# Patient Record
Sex: FEMALE | Race: WHITE
[De-identification: names, ages, dates, MRNs, and addresses within clinical notes are randomized per-mention and may not be internally consistent; named-entity substitution may affect disease eponyms.]

---

## 2018-02-08 ENCOUNTER — HOSPITAL ENCOUNTER (OUTPATIENT)
Dept: HOSPITAL 17 - NEPE | Age: 83
Setting detail: OBSERVATION
LOS: 1 days | Discharge: HOME | End: 2018-02-09
Attending: INTERNAL MEDICINE | Admitting: INTERNAL MEDICINE
Payer: MEDICAID

## 2018-02-08 VITALS
TEMPERATURE: 98.8 F | RESPIRATION RATE: 18 BRPM | HEART RATE: 58 BPM | OXYGEN SATURATION: 97 % | SYSTOLIC BLOOD PRESSURE: 222 MMHG | DIASTOLIC BLOOD PRESSURE: 90 MMHG

## 2018-02-08 VITALS
DIASTOLIC BLOOD PRESSURE: 112 MMHG | HEART RATE: 69 BPM | TEMPERATURE: 97.8 F | OXYGEN SATURATION: 100 % | SYSTOLIC BLOOD PRESSURE: 246 MMHG | RESPIRATION RATE: 18 BRPM

## 2018-02-08 VITALS
OXYGEN SATURATION: 98 % | HEART RATE: 59 BPM | SYSTOLIC BLOOD PRESSURE: 169 MMHG | DIASTOLIC BLOOD PRESSURE: 64 MMHG | RESPIRATION RATE: 18 BRPM

## 2018-02-08 VITALS — BODY MASS INDEX: 18.18 KG/M2 | WEIGHT: 92.59 LBS | HEIGHT: 60 IN

## 2018-02-08 VITALS
TEMPERATURE: 97.8 F | HEART RATE: 74 BPM | OXYGEN SATURATION: 97 % | DIASTOLIC BLOOD PRESSURE: 60 MMHG | SYSTOLIC BLOOD PRESSURE: 130 MMHG | RESPIRATION RATE: 18 BRPM

## 2018-02-08 VITALS — OXYGEN SATURATION: 99 %

## 2018-02-08 DIAGNOSIS — I20.9: ICD-10-CM

## 2018-02-08 DIAGNOSIS — R00.1: ICD-10-CM

## 2018-02-08 DIAGNOSIS — I44.0: ICD-10-CM

## 2018-02-08 DIAGNOSIS — R07.89: Primary | ICD-10-CM

## 2018-02-08 DIAGNOSIS — I10: ICD-10-CM

## 2018-02-08 DIAGNOSIS — R94.31: ICD-10-CM

## 2018-02-08 DIAGNOSIS — J32.4: ICD-10-CM

## 2018-02-08 DIAGNOSIS — R06.00: ICD-10-CM

## 2018-02-08 DIAGNOSIS — Z79.899: ICD-10-CM

## 2018-02-08 DIAGNOSIS — Z79.82: ICD-10-CM

## 2018-02-08 DIAGNOSIS — R51: ICD-10-CM

## 2018-02-08 LAB
BASOPHILS # BLD AUTO: 0.1 TH/MM3 (ref 0–0.2)
BASOPHILS NFR BLD: 0.7 % (ref 0–2)
BUN SERPL-MCNC: 9 MG/DL (ref 7–18)
CALCIUM SERPL-MCNC: 9.2 MG/DL (ref 8.5–10.1)
CHLORIDE SERPL-SCNC: 92 MEQ/L (ref 98–107)
CREAT SERPL-MCNC: 0.9 MG/DL (ref 0.5–1)
EOSINOPHIL # BLD: 0 TH/MM3 (ref 0–0.4)
EOSINOPHIL NFR BLD: 0.2 % (ref 0–4)
ERYTHROCYTE [DISTWIDTH] IN BLOOD BY AUTOMATED COUNT: 12.3 % (ref 11.6–17.2)
GFR SERPLBLD BASED ON 1.73 SQ M-ARVRAT: 59 ML/MIN (ref 89–?)
GLUCOSE SERPL-MCNC: 130 MG/DL (ref 74–106)
HCO3 BLD-SCNC: 28.1 MEQ/L (ref 21–32)
HCT VFR BLD CALC: 40.9 % (ref 35–46)
HGB BLD-MCNC: 14.1 GM/DL (ref 11.6–15.3)
INR PPP: 1 RATIO
LYMPHOCYTES # BLD AUTO: 1.2 TH/MM3 (ref 1–4.8)
LYMPHOCYTES NFR BLD AUTO: 16.3 % (ref 9–44)
MAGNESIUM SERPL-MCNC: 1.8 MG/DL (ref 1.5–2.5)
MCH RBC QN AUTO: 31.7 PG (ref 27–34)
MCHC RBC AUTO-ENTMCNC: 34.5 % (ref 32–36)
MCV RBC AUTO: 91.9 FL (ref 80–100)
MONOCYTE #: 0.3 TH/MM3 (ref 0–0.9)
MONOCYTES NFR BLD: 4.6 % (ref 0–8)
NEUTROPHILS # BLD AUTO: 5.7 TH/MM3 (ref 1.8–7.7)
NEUTROPHILS NFR BLD AUTO: 78.2 % (ref 16–70)
PLATELET # BLD: 266 TH/MM3 (ref 150–450)
PMV BLD AUTO: 9.6 FL (ref 7–11)
PROTHROMBIN TIME: 10.6 SEC (ref 9.8–11.6)
RBC # BLD AUTO: 4.45 MIL/MM3 (ref 4–5.3)
SODIUM SERPL-SCNC: 128 MEQ/L (ref 136–145)
TROPONIN I SERPL-MCNC: (no result) NG/ML (ref 0.02–0.05)
TROPONIN I SERPL-MCNC: (no result) NG/ML (ref 0.02–0.05)
WBC # BLD AUTO: 7.3 TH/MM3 (ref 4–11)

## 2018-02-08 PROCEDURE — G0378 HOSPITAL OBSERVATION PER HR: HCPCS

## 2018-02-08 PROCEDURE — 96374 THER/PROPH/DIAG INJ IV PUSH: CPT

## 2018-02-08 PROCEDURE — 80048 BASIC METABOLIC PNL TOTAL CA: CPT

## 2018-02-08 PROCEDURE — 83735 ASSAY OF MAGNESIUM: CPT

## 2018-02-08 PROCEDURE — 99285 EMERGENCY DEPT VISIT HI MDM: CPT

## 2018-02-08 PROCEDURE — 78452 HT MUSCLE IMAGE SPECT MULT: CPT

## 2018-02-08 PROCEDURE — 93017 CV STRESS TEST TRACING ONLY: CPT

## 2018-02-08 PROCEDURE — A9502 TC99M TETROFOSMIN: HCPCS

## 2018-02-08 PROCEDURE — 84484 ASSAY OF TROPONIN QUANT: CPT

## 2018-02-08 PROCEDURE — 70450 CT HEAD/BRAIN W/O DYE: CPT

## 2018-02-08 PROCEDURE — 85730 THROMBOPLASTIN TIME PARTIAL: CPT

## 2018-02-08 PROCEDURE — 93005 ELECTROCARDIOGRAM TRACING: CPT

## 2018-02-08 PROCEDURE — 82552 ASSAY OF CPK IN BLOOD: CPT

## 2018-02-08 PROCEDURE — 71045 X-RAY EXAM CHEST 1 VIEW: CPT

## 2018-02-08 PROCEDURE — 82550 ASSAY OF CK (CPK): CPT

## 2018-02-08 PROCEDURE — 85610 PROTHROMBIN TIME: CPT

## 2018-02-08 PROCEDURE — 85025 COMPLETE CBC W/AUTO DIFF WBC: CPT

## 2018-02-08 RX ADMIN — Medication SCH ML: at 21:36

## 2018-02-08 NOTE — PD
HPI


Chief Complaint:  chest pain


Time Seen by Provider:  17:23


Travel History


International Travel<30 days:  No


Contact w/Intl Traveler<30days:  No


Traveled to known affect area:  No





History of Present Illness


HPI


88-year-old female with history of hypertension presents emergency department 

today for evaluation of chest pain, intermittent, headache, and elevated blood 

pressure.  Patient started having intermittent chest pain last evening.  States 

the pain was "really bad."  Denies any nausea vomiting.No lightheadedness.  No 

focal deficits or weakness.  Patient is primarily British speaking and 

translation is done with nursing staff who speaks British.





[facility]


Past Medical History


Hypertension:  Yes





Social History


Alcohol Use:  No


Tobacco Use:  No


Substance Use:  No





Allergies-Medications


(Allergen,Severity, Reaction):  


Coded Allergies:  


     Penicillins (Verified  Allergy, Unknown, hives, 2/8/18)


Reported Meds & Prescriptions





Reported Meds & Active Scripts


Active


Reported


Losartan (Losartan Potassium) 100 Mg Tab 100 Mg PO DAILY


Metoprolol Tartrate 50 Mg Tab 50 Mg PO DAILY


Aspirin 81 Mg Chew 81 Mg PO DAILY








Review of Systems


Except as stated in HPI:  all other systems reviewed are Neg





Physical Exam


Narrative


GENERAL: Well-nourished elderly female patient, sitting in bed, in no acute 

distress.


SKIN: Focused skin assessment warm/dry.


HEAD: Atraumatic. Normocephalic. 


EYES: Pupils equal and round. No scleral icterus. No injection or drainage. 


ENT: No nasal bleeding or discharge.  Mucous membranes pink and moist.


NECK: Trachea midline. No JVD. 


CARDIOVASCULAR: Regular rate and rhythm.  No murmur appreciated.


RESPIRATORY: No accessory muscle use. Clear to auscultation. Breath sounds 

equal bilaterally. 


GASTROINTESTINAL: Abdomen soft, non-tender, nondistended. Hepatic and splenic 

margins not palpable. 


MUSCULOSKELETAL: No obvious deformities. No clubbing.  No cyanosis.  No edema. 


NEUROLOGICAL: Awake and alert. No obvious cranial nerve deficits.  Motor 

grossly within normal limits. Normal speech.


PSYCHIATRIC: Appropriate mood and affect; insight and judgment normal.





Data


Data


Last Documented VS





Vital Signs








  Date Time  Temp Pulse Resp B/P (MAP) Pulse Ox O2 Delivery O2 Flow Rate FiO2


 


2/8/18 19:01  59 18 169/64 (99) 98 Room Air  


 


2/8/18 18:27 98.8       








Orders





 Orders


Electrocardiogram (2/8/18 17:31)


Basic Metabolic Panel (Bmp) (2/8/18 17:31)


Ckmb (Isoenzyme) Profile (2/8/18 17:31)


Complete Blood Count With Diff (2/8/18 17:31)


Magnesium (Mg) (2/8/18 17:31)


Prothrombin Time / Inr (Pt) (2/8/18 17:31)


Act Partial Throm Time (Ptt) (2/8/18 17:31)


Troponin I (2/8/18 17:31)


Chest, Single Ap (2/8/18 17:31)


Ecg Monitoring (2/8/18 17:31)


Bilateral Bp Monitoring (2/8/18 17:31)


Iv Access Insert/Monitor (2/8/18 17:31)


Oximetry (2/8/18 17:31)


Oxygen Administration (2/8/18 17:31)


Aspirin Chew (Aspirin Chew) (2/8/18 17:45)


Sodium Chloride 0.9% Flush (Ns Flush) (2/8/18 17:45)


Ct Brain W/O Iv Contrast(Rout) (2/8/18 )


Clonidine (Catapres) (2/8/18 17:45)


Hydralazine Inj (Apresoline Inj) (2/8/18 18:00)


Activity Bed Rest With Brp (2/8/18 19:45)


Vital Signs (Adult) Q4H (2/8/18 19:45)


Cardiac Rhythm .As Directed (2/8/18 19:45)


Notify Dr: Other .PRN (2/8/18 19:45)


Notify  Parameters (2/8/18 19:45)


Resp Oxygen Nasal Cannula (2/8/18 )


Diet Npo (2/9/18 Breakfast)


Ckmb (Isoenzyme) Profile (2/8/18 20:40)


Ckmb (Isoenzyme) Profile (2/8/18 23:40)


Troponin I (2/8/18 20:40)


Troponin I (2/8/18 23:40)


Electrocardiogram (2/8/18 20:40)


Electrocardiogram (2/8/18 23:40)


^ Obtain (2/8/18 19:45)


Sodium Chloride 0.9% Flush (Ns Flush) (2/8/18 19:45)


Sodium Chloride 0.9% Flush (Ns Flush) (2/8/18 21:00)


Acetaminophen (Tylenol) (2/8/18 19:45)


Morphine Inj (Morphine Inj) (2/8/18 19:45)


Ondansetron Inj (Zofran Inj) (2/8/18 19:45)


Nitroglycerin Sl (Nitrostat Sl) (2/8/18 19:45)


Cardiac Monitor / Telemetry FRAN.Q8H (2/8/18 19:45)


Job Bilateral/Knee High FRAN.QSHIFT (2/8/18 19:45)


Admit Order (Ed Use Only) (2/8/18 19:45)





Labs





Laboratory Tests








Test


  2/8/18


17:40 2/8/18


18:50


 


White Blood Count 7.3 TH/MM3  


 


Red Blood Count 4.45 MIL/MM3  


 


Hemoglobin 14.1 GM/DL  


 


Hematocrit 40.9 %  


 


Mean Corpuscular Volume 91.9 FL  


 


Mean Corpuscular Hemoglobin 31.7 PG  


 


Mean Corpuscular Hemoglobin


Concent 34.5 % 


  


 


 


Red Cell Distribution Width 12.3 %  


 


Platelet Count 266 TH/MM3  


 


Mean Platelet Volume 9.6 FL  


 


Neutrophils (%) (Auto) 78.2 %  


 


Lymphocytes (%) (Auto) 16.3 %  


 


Monocytes (%) (Auto) 4.6 %  


 


Eosinophils (%) (Auto) 0.2 %  


 


Basophils (%) (Auto) 0.7 %  


 


Neutrophils # (Auto) 5.7 TH/MM3  


 


Lymphocytes # (Auto) 1.2 TH/MM3  


 


Monocytes # (Auto) 0.3 TH/MM3  


 


Eosinophils # (Auto) 0.0 TH/MM3  


 


Basophils # (Auto) 0.1 TH/MM3  


 


CBC Comment AUTO DIFF  


 


Differential Comment


  AUTO DIFF


CONFIRMED 


 


 


Platelet Estimate NORMAL  


 


Platelet Morphology Comment NORMAL  


 


Prothrombin Time 10.6 SEC  


 


Prothromb Time International


Ratio 1.0 RATIO 


  


 


 


Activated Partial


Thromboplast Time 22.5 SEC 


  


 


 


Blood Urea Nitrogen  9 MG/DL 


 


Creatinine  0.90 MG/DL 


 


Random Glucose  130 MG/DL 


 


Calcium Level  9.2 MG/DL 


 


Magnesium Level  1.8 MG/DL 


 


Sodium Level  128 MEQ/L 


 


Potassium Level  4.0 MEQ/L 


 


Chloride Level  92 MEQ/L 


 


Carbon Dioxide Level  28.1 MEQ/L 


 


Anion Gap  8 MEQ/L 


 


Estimat Glomerular Filtration


Rate 


  59 ML/MIN 


 


 


Total Creatine Kinase  96 U/L 


 


Troponin I


  


  LESS THAN 0.02


NG/ML











MDM


Medical Decision Making


Medical Screen Exam Complete:  Yes


Emergency Medical Condition:  Yes


Medical Record Reviewed:  Yes


Differential Diagnosis


ACS versus hypertension versus vasospasm versus pleuritic pain versus migraine 

headache


Narrative Course


88-year-old female presents emergency department for evaluation.  Patient 

appears without distress.  She is quite hypertensive here in emergency 

department.  She is given hydralazine.  Patient reports chest pain, moderate in 

severity.  EKG is reviewed by my attending physician.





Laboratory Tests








Test


  2/8/18


17:40 2/8/18


18:50


 


White Blood Count 7.3 TH/MM3  


 


Red Blood Count 4.45 MIL/MM3  


 


Hemoglobin 14.1 GM/DL  


 


Hematocrit 40.9 %  


 


Mean Corpuscular Volume 91.9 FL  


 


Mean Corpuscular Hemoglobin 31.7 PG  


 


Mean Corpuscular Hemoglobin


Concent 34.5 % 


  


 


 


Red Cell Distribution Width 12.3 %  


 


Platelet Count 266 TH/MM3  


 


Mean Platelet Volume 9.6 FL  


 


Neutrophils (%) (Auto) 78.2 %  


 


Lymphocytes (%) (Auto) 16.3 %  


 


Monocytes (%) (Auto) 4.6 %  


 


Eosinophils (%) (Auto) 0.2 %  


 


Basophils (%) (Auto) 0.7 %  


 


Neutrophils # (Auto) 5.7 TH/MM3  


 


Lymphocytes # (Auto) 1.2 TH/MM3  


 


Monocytes # (Auto) 0.3 TH/MM3  


 


Eosinophils # (Auto) 0.0 TH/MM3  


 


Basophils # (Auto) 0.1 TH/MM3  


 


CBC Comment AUTO DIFF  


 


Differential Comment


  AUTO DIFF


CONFIRMED 


 


 


Platelet Estimate NORMAL  


 


Platelet Morphology Comment NORMAL  


 


Prothrombin Time 10.6 SEC  


 


Prothromb Time International


Ratio 1.0 RATIO 


  


 


 


Activated Partial


Thromboplast Time 22.5 SEC 


  


 


 


Blood Urea Nitrogen  9 MG/DL 


 


Creatinine  0.90 MG/DL 


 


Random Glucose  130 MG/DL 


 


Calcium Level  9.2 MG/DL 


 


Magnesium Level  1.8 MG/DL 


 


Sodium Level  128 MEQ/L 


 


Potassium Level  4.0 MEQ/L 


 


Chloride Level  92 MEQ/L 


 


Carbon Dioxide Level  28.1 MEQ/L 


 


Anion Gap  8 MEQ/L 


 


Estimat Glomerular Filtration


Rate 


  59 ML/MIN 


 


 


Total Creatine Kinase  96 U/L 


 


Troponin I


  


  LESS THAN 0.02


NG/ML








Last Impressions








Chest X-Ray 2/8/18 3841 Signed





Impressions: 





 Service Date/Time:  Thursday, February 8, 2018 17:45 - CONCLUSION:  No 

evidence 





 of acute cardiopulmonary disease.     Elijah Gaytan MD 


 


Head CT 2/8/18 0000 Signed





Impressions: 





 Service Date/Time:  Thursday, February 8, 2018 18:37 - CONCLUSION:  No acute 





 intracranial abnormality. Pansinusitis.     Elijah Gaytan MD 





Patient's blood pressure has decreased.  She reports much improvement in her 

symptoms.  Labs and radiology are reviewed by myself and discussed with my 

attending physician.  Patient will be admitted to the chest pain center for 

further evaluation.  Plan is discussed with the patient and her family.  They 

are in agreement with this plan of care.





Diagnosis





 Primary Impression:  


 Chest pain


 Qualified Codes:  R07.9 - Chest pain, unspecified


 Additional Impressions:  


 Hypertension


 Qualified Codes:  I10 - Essential (primary) hypertension


 Headache


 Qualified Codes:  R51 - Headache





Admitting Information


Admitting Physician Requests:  Observation


Condition:  Stable











Shannon Sharp Feb 8, 2018 17:27

## 2018-02-08 NOTE — RADRPT
EXAM DATE/TIME:  02/08/2018 18:37 

 

[facility] COMPARISON:     

No previous studies available for comparison.

 

 

INDICATIONS :     

Cephalgia.

                      

 

RADIATION DOSE:     

34.71 CTDIvol (mGy) 

 

 

 

MEDICAL HISTORY :     

Hypertension.  

 

SURGICAL HISTORY :      

None. 

 

ENCOUNTER:      

Initial

 

ACUITY:      

1 day

 

PAIN SCALE:      

8/10

 

LOCATION:        

cranial 

 

TECHNIQUE:     

Multiple contiguous axial images were obtained of the head.  Using automated exposure control and adj
ustment of the mA and/or kV according to patient size, radiation dose was kept as low as reasonably a
chievable to obtain optimal diagnostic quality images.   DICOM format image data is available electro
nically for review and comparison.  

 

FINDINGS:     

 

CEREBRUM:     

The ventricles are normal for age.  No evidence of midline shift, mass lesion, hemorrhage or acute in
farction.  No extra-axial fluid collections are seen.

 

POSTERIOR FOSSA:     

The cerebellum and brainstem are intact.  The 4th ventricle is midline.  The cerebellopontine angle i
s unremarkable.

 

EXTRACRANIAL:     

There is mucoperiosteal thickening diffusely of the visualized paranasal sinuses. Visualized mastoid 
air cells are clear.

 

SKULL:     

The calvaria is intact.  No evidence of skull fracture.

 

CONCLUSION:     

No acute intracranial abnormality. Pansinusitis.

 

 

 

 Elijah Gaytan MD on February 08, 2018 at 18:50           

Board Certified Radiologist.

 This report was verified electronically.

## 2018-02-08 NOTE — RADRPT
EXAM DATE/TIME:  02/08/2018 17:45 

 

[facility] COMPARISON:     

No previous studies available for comparison.

 

                     

INDICATIONS :     

Chest pain. 

                     

 

MEDICAL HISTORY :     

Hypertension.          

 

SURGICAL HISTORY :     

None.   

 

ENCOUNTER:     

Initial                                        

 

ACUITY:     

1 day      

 

PAIN SCORE:     

10/10

 

LOCATION:     

Right chest 

 

FINDINGS:     

A single view of the chest demonstrates the lungs to be symmetrically aerated without evidence of mas
s, infiltrate or effusion.  The cardiomediastinal contours are unremarkable.  Osseous structures are 
intact.

 

CONCLUSION:     

No evidence of acute cardiopulmonary disease.

 

 

 

 Elijah Gaytan MD on February 08, 2018 at 18:02           

Board Certified Radiologist.

 This report was verified electronically.

## 2018-02-09 VITALS — SYSTOLIC BLOOD PRESSURE: 158 MMHG | DIASTOLIC BLOOD PRESSURE: 66 MMHG

## 2018-02-09 VITALS
OXYGEN SATURATION: 99 % | HEART RATE: 57 BPM | SYSTOLIC BLOOD PRESSURE: 182 MMHG | DIASTOLIC BLOOD PRESSURE: 84 MMHG | RESPIRATION RATE: 18 BRPM

## 2018-02-09 VITALS — SYSTOLIC BLOOD PRESSURE: 194 MMHG | DIASTOLIC BLOOD PRESSURE: 88 MMHG

## 2018-02-09 VITALS
SYSTOLIC BLOOD PRESSURE: 144 MMHG | HEART RATE: 60 BPM | OXYGEN SATURATION: 97 % | TEMPERATURE: 98.2 F | DIASTOLIC BLOOD PRESSURE: 65 MMHG | RESPIRATION RATE: 18 BRPM

## 2018-02-09 VITALS — HEART RATE: 58 BPM

## 2018-02-09 VITALS — DIASTOLIC BLOOD PRESSURE: 88 MMHG | SYSTOLIC BLOOD PRESSURE: 190 MMHG

## 2018-02-09 VITALS — HEART RATE: 53 BPM

## 2018-02-09 LAB — TROPONIN I SERPL-MCNC: (no result) NG/ML (ref 0.02–0.05)

## 2018-02-09 RX ADMIN — Medication SCH ML: at 09:38

## 2018-02-09 NOTE — RADRPT
EXAM DATE/TIME:  02/09/2018 13:03 

 

[facility] COMPARISON:     

No previous studies available for comparison.

 

 

INDICATIONS :     

Substernal chest pain. Angina. 

                           

 

DOSE:     

25.6 mCi Tc99m Myoview at stress.

                     8.1 mCi Tc99m Myoview at rest.

                     0.4 mg Lexiscan

                       

 

 

STRESS SYMPTOMS:      

Dyspnea.

                       

 

 

EJECTION FRACTION:       

70%

                       

 

MEDICAL HISTORY :     

Hypertension.   

 

SURGICAL HISTORY :      

Appendectomy.   Left knee.

 

ENCOUNTER:     

Initial

 

ACUITY:     

1 day

 

PAIN SCALE:     

4/10

 

LOCATION:      

Substernal chest 

 

TECHNIQUE:     

The patient underwent pharmacologic stress with infusion of prescribed dose.  Continuous ECG tracing 
was monitored during stress.  Gated SPECT imaging was performed after stress and conventional SPECT i
maging was performed at rest.  The examination was performed on a SPECT/CT scanner, both attenuation 
and non-corrected datasets were reviewed.

 

FINDINGS:     

 

DISTRIBUTION:     

The maximum perfused segment at stress is in the anteroseptal wall.

 

PERFUSION STUDY:     

The pattern of perfusion at stress shows about 10% redistribution in portions of the mid and lower an
terior wall which would not be considered statistically significant..

 

GATED STUDY:     

There is intact wall motion and thickening without hypokinetic or dyskinetic segments. 

 

CONCLUSION:     

1. 10% redistribution in segments of the mid and lower anterior wall. This would not be considered st
atistically significant.

2. Otherwise, no scintigraphic findings of infarct or ischemia.

3. Adequate wall motion throughout the estimated ejection fraction of 70%

 

RISK CATEGORY:     Low (<1% Annual Mortality Rate)

 

 

 

 

 Nate Carey MD on February 09, 2018 at 14:24           

Board Certified Radiologist.

 This report was verified electronically.

## 2018-02-09 NOTE — EKG
Date Performed: 02/08/2018       Time Performed: 17:40:10

 

PTAGE:      88 years

 

EKG:      NORMAL Sinus rhythm 

 

 MINIMAL VOLTAGE CRITERIA FOR LVH, CONSIDER NORMAL VARIANT INFERIOR MYOCARDIAL INFARCTION ABNORMAL EC
G 

 

NO PREVIOUS TRACING            

 

DOCTOR:   Lloyd Herr  Interpretating Date/Time  02/09/2018 16:18:22

## 2018-02-09 NOTE — HHI.HP
HPI


Primary Care Physician


Unknown


Chief Complaint


Chest pain


History of Present Illness


88 year old [place] speaking women presents to ER for further evaluation of 

chest pain. Interview completed with [employer] system. Onset 

yesterday. Location right anterior chest with radiation substernally. 

Characterized as crushing, heavy pain. No so states his symptoms of nausea, 

vomiting, dyspnea, or diaphoresis.  No known precipitating or relieving 

factors.  Denies similar pain in the past.  Noted her blood pressure to be 

elevated also.





Review of Systems


General: No fatigue,weakness, fever, chills, or recent illness change in 

appetite.  Had been in her general state health


HEENT: HA has resolved.  Reported headache in ER yesterday. 


CV: As stated above.  No current chest pain or pressure.


RESP: No SOB, cough, wheeze, or recent upper a store infection.


GI: No nausea, vomiting, or bowel changes


: No dysuria, urgency, frequency


EXT: No lower leg edema, no paraesthesias


MS: No discomfort or change in ROM


NEURO: No difficulty with balance, LOC, motor/sensory deficits


PSYCH: No anxiety, depression


SKIN: No rashes, no concerning lesions





Past Family Social History


Allergies:  


Coded Allergies:  


     Penicillins (Verified  Allergy, Unknown, hives, 18)


Past Medical History


Hypertension


Reported Medications





Reported Meds & Active Scripts


Active


Reported


Losartan (Losartan Potassium) 100 Mg Tab 100 Mg PO DAILY


Metoprolol Tartrate 50 Mg Tab 50 Mg PO DAILY


Aspirin 81 Mg Chew 81 Mg PO DAILY


Active Ordered Medications





Current Medications








 Medications


  (Trade)  Dose


 Ordered  Sig/Juliana


 Route  Start Time


 Stop Time Status Last Admin


 


  (NS Flush)  2 ml  UNSCH  PRN


 IV FLUSH  18 19:45


     


 


 


  (NS Flush)  2 ml  BID


 IV FLUSH  18 21:00


    18 21:36


 


 


  (Tylenol)  500 mg  Q4H  PRN


 PO  18 19:45


     


 


 


  (Morphine Inj)  2 mg  Q4H  PRN


 IV PUSH  18 19:45


     


 


 


  (Zofran Inj)  4 mg  Q6H  PRN


 IV PUSH  18 19:45


     


 


 


  (Nitrostat Sl)  0.4 mg  Q5M  PRN


 SL  18 19:45


     


 


 


  (Aspirin)  325 mg  DAILY


 PO  18 09:00


     


 








Social History


Known hypertension.  No known hyperlipidemia, coronary artery disease, or 

diabetes.


Lifelong nonsmoker.





Past cardiac testing


None





Physical Exam


Vital Signs





Vital Signs








  Date Time  Temp Pulse Resp B/P (MAP) Pulse Ox O2 Delivery O2 Flow Rate FiO2


 


18 08:20 98.2 60 18 144/65 (91) 97   


 


18 00:37        21


 


18 23:26 97.8 74 18 130/60 (83) 97   


 


18 22:24        


 


18 19:01  59 18 169/64 (99) 98 Room Air  


 


18 18:27 98.8 58 18 222/90 (134) 97 Room Air  


 


18 17:42     99 Room Air  


 


18 17:42     99 Room Air  


 


18 17:30  69 16  98 Room Air  


 


18 17:30 97.8 69 18 246/112 (156) 100   








Physical Exam


GENERAL: Alert WN, WD, NAD, pleasant, elderly  female


HEAD: NC, AT


CV: RRR, without murmur, rub, gallop, no JVD, S1-S2 no S3-S4.  Chest wall 

nontender with palpation.


RESP: Clear lungs throughout bilateral, no crackles, wheeze, rhonchi, 

symmetrical chest rise, nonlabored, able to speak in full sentences


ABD: Soft, NT, ND, no masses, positive bowel tones


EXT: Pulses +24, no dependent edema


MS: Normal tone 4 extremities, nontender, no obvious deformities, full range 

of motion


NEURO: CN II through CN XII grossly intact, motor strength 5/5


PSYCH: A+O 3, pleasant affect, [place]-speaking, appropriate mood, insight and 

judgment


SKIN: Normal turgor, normal texture, no lesions, no rashes, brisk cap refill, 

even hair distribution


Laboratory





Laboratory Tests








Test


  18


17:40 18


18:50 18


21:30 18


00:39


 


White Blood Count 7.3    


 


Red Blood Count 4.45    


 


Hemoglobin 14.1    


 


Hematocrit 40.9    


 


Mean Corpuscular Volume 91.9    


 


Mean Corpuscular Hemoglobin 31.7    


 


Mean Corpuscular Hemoglobin


Concent 34.5 


  


  


  


 


 


Red Cell Distribution Width 12.3    


 


Platelet Count 266    


 


Mean Platelet Volume 9.6    


 


Neutrophils (%) (Auto) 78.2    


 


Lymphocytes (%) (Auto) 16.3    


 


Monocytes (%) (Auto) 4.6    


 


Eosinophils (%) (Auto) 0.2    


 


Basophils (%) (Auto) 0.7    


 


Neutrophils # (Auto) 5.7    


 


Lymphocytes # (Auto) 1.2    


 


Monocytes # (Auto) 0.3    


 


Eosinophils # (Auto) 0.0    


 


Basophils # (Auto) 0.1    


 


CBC Comment AUTO DIFF    


 


Differential Comment


  AUTO DIFF


CONFIRMED 


  


  


 


 


Platelet Estimate NORMAL    


 


Platelet Morphology Comment NORMAL    


 


Prothrombin Time 10.6    


 


Prothromb Time International


Ratio 1.0 


  


  


  


 


 


Activated Partial


Thromboplast Time 22.5 


  


  


  


 


 


Blood Urea Nitrogen  9   


 


Creatinine  0.90   


 


Random Glucose  130   


 


Calcium Level  9.2   


 


Magnesium Level  1.8   


 


Sodium Level  128   


 


Potassium Level  4.0   


 


Chloride Level  92   


 


Carbon Dioxide Level  28.1   


 


Anion Gap  8   


 


Estimat Glomerular Filtration


Rate 


  59 


  


  


 


 


Total Creatine Kinase  96  85  130 


 


Troponin I  LESS THAN 0.02  LESS THAN 0.02  LESS THAN 0.02 


 


Creatine Kinase MB    1.6 








Result Diagram:  


18 1740                                                                    

            18 1850





Imaging





Last 48 hours Impressions








Myocardial Perfusion Scan Nuc Med 18 0000 Signed





Impressions: 





 Service Date/Time:  2018 13:03 - CONCLUSION:  1. 10%% 





 redistribution in segments of the mid and lower anterior wall. This would not 

be 





 considered statistically significant. 2. Otherwise, no scintigraphic findings 

of 





 infarct or ischemia. 3. Adequate wall motion throughout the estimated ejection 





 fraction of 70%%  RISK CATEGORY:     Low (<1%% Annual Mortality Rate)      

Nate Carey MD 


 


Chest X-Ray 18 1731 Signed





Impressions: 





 Service Date/Time:   17:45 - CONCLUSION:  No 

evidence 





 of acute cardiopulmonary disease.     Elijah Gaytan MD 


 


Head CT 18 0000 Signed





Impressions: 





 Service Date/Time:   18:37 - CONCLUSION:  No acute 





 intracranial abnormality. Pansinusitis.     Elijah Gaytan MD 








Course


EKG


First-degree AV block, no ST or T-segment changes





Caprini VTE Risk Assessment


Caprini VTE Risk Assessment:  Mod/High Risk (score >= 2)


Caprini Risk Assessment Model











 Point Value = 1          Point Value = 2  Point Value = 3  Point Value = 5


 


Age 41-60


Minor surgery


BMI > 25 kg/m2


Swollen legs


Varicose veins


Pregnancy or postpartum


History of unexplained or recurrent


   spontaneous 


Oral contraceptives or hormone


   replacement


Sepsis (< 1 month)


Serious lung disease, including


   pneumonia (< 1 month)


Abnormal pulmonary function


Acute myocardial infarction


Congestive heart failure (< 1 month)


History of inflammatory bowel disease


Medical patient at bed rest Age 61-74


Arthroscopic surgery


Major open surgery (> 45 min)


Laparoscopic surgery (> 45 min)


Malignancy


Confined to bed (> 72 hours)


Immobilizing plaster cast


Central venous access Age >= 75


History of VTE


Family history of VTE


Factor V Leiden


Prothrombin 42893S


Lupus anticoagulant


Anticardiolipin antibodies


Elevated serum homocysteine


Heparin-induced thrombocytopenia


Other congenital or acquired


   thrombophilia Stroke (< 1 month)


Elective arthroplasty


Hip, pelvis, or leg fracture


Acute spinal cord injury (< 1 month)








Prophylaxis Regimen











   Total Risk


Factor Score Risk Level Prophylaxis Regimen


 


0-1      Low Early ambulation


 


2 Moderate Order ONE of the following:


*Sequential Compression Device (SCD)


*Heparin 5000 units SQ BID


 


3-4 Higher Order ONE of the following medications:


*Heparin 5000 units SQ TID


*Enoxaparin/Lovenox 40 mg SQ daily (WT < 150 kg, CrCl > 30 mL/min)


*Enoxaparin/Lovenox 30 mg SQ daily (WT < 150 kg, CrCl > 10-29 mL/min)


*Enoxaparin/Lovenox 30 mg SQ BID (WT < 150 kg, CrCl > 30 mL/min)


AND/OR


*Sequential Compression Device (SCD)


 


5 or more Highest Order ONE of the following medications:


*Heparin 5000 units SQ TID (Preferred with Epidurals)


*Enoxaparin/Lovenox 40 mg SQ daily (WT < 150 kg, CrCl > 30 mL/min)


*Enoxaparin/Lovenox 30 mg SQ daily (WT < 150 kg, CrCl > 10-29 mL/min)


*Enoxaparin/Lovenox 30 mg SQ BID (WT < 150 kg, CrCl > 30 mL/min)


AND


*Sequential Compression Device (SCD)











Assessment and Plan


Assessment and Plan


#1 Atypical chest pain -admitted to chest pain center.  Ruled out with 3 sets 

of EKGs, cardiac events, and monitored on telemetry overnight.  Seen and 

evaluated by Dr. Lloyd Herr.  Proceed with Lexiscan this morning.  If 

unremarkable, plans to discharge home with follow-up with primary care 

provider.  Patient and daughter at bedside are agreeable to plan of care.


#2 Hypertension-continue metoprolol and losartan, clonidine 0.1 mg every 6 

hours when necessary as needed for systolic blood pressure greater than 180 or 

diastolic 95.











Sherry Mathis 2018 08:50

## 2018-02-09 NOTE — HHI.DCPOC
Discharge Care Plan


Diagnosis:  


(1) Atypical chest pain


(2) Hypertension


Goals to Promote Your Health


* To prevent worsening of your condition and complications


* To maintain your health at the optimal level


Directions to Meet Your Goals


*** Take your medications as prescribed


*** Follow your dietary instruction


*** Follow activity as directed








*** Keep your appointments as scheduled


*** Take your immunizations and boosters as scheduled


*** If your symptoms worsen call your PCP, if no PCP go to Urgent Care Center 

or Emergency Room***


*** Smoking is Dangerous to Your Health. Avoid second hand smoke***


***Call the 24-hour hour crisis hotline for domestic abuse at 1-899.384.5844***











Sherry Mathis Feb 9, 2018 16:27

## 2018-02-09 NOTE — EKG
Date Performed: 02/09/2018       Time Performed: 02:22:10

 

PTAGE:      88 years

 

EKG:      Sinus rhythm 

 

 WITH FIRST DEGREE AV BLOCK ABNORMAL ECG

 

PREVIOUS TRACING       : 02/08/2018 17.40 Since previous tracing, no significant change noted

 

DOCTOR:   Lloyd Herr  Interpretating Date/Time  02/09/2018 16:14:19

## 2018-02-09 NOTE — EKG
Date Performed: 02/08/2018       Time Performed: 21:26:45

 

PTAGE:      88 years

 

EKG:      SINUS BRADYCARDIA WITH FIRST DEGREE AV BLOCK POSSIBLE LEFT ATRIAL ENLARGEMENT POSSIBLE LEFT
 VENTRICULAR HYPERTROPHY NONSPECIFIC T-WAVE ABNORMALITY ABNORMAL ECG

 

PREVIOUS TRACING       : 02/08/2018 17.40 Since previous tracing, no significant change noted

 

DOCTOR:   Lloyd Herr  Interpretating Date/Time  02/09/2018 16:16:51

## 2018-02-09 NOTE — TR
Date Performed: 02/09/2018       Time Performed: 13:20:27

 

DOCTOR:      Lloyd Herr 

 

DRUG LIST:     

CLINICAL HISTORY:      ANGINA

REASON FOR TEST:      Angina

REASON FOR ENDING:     

OBSERVATION:     

CONCLUSION:      Lexiscan stress test was performed under standard four minute protocol.  Radionuclid
e was injected one minute prior to ending the test. No electrocardiographic abormalities were present
 to suggest ischemia. Nuclear imaging and interpretation are pending.

COMMENTS: